# Patient Record
Sex: FEMALE | Race: OTHER
[De-identification: names, ages, dates, MRNs, and addresses within clinical notes are randomized per-mention and may not be internally consistent; named-entity substitution may affect disease eponyms.]

---

## 2017-02-19 NOTE — EMERGENCY ROOM REPORT
History of Present Illness


General


Chief Complaint:  Abdominal Pain


Source:  Patient





Present Illness


HPI


30 YO F with 2 days of urinary urge incontinence, dysuria, frequency. Denies 

abd pain, nausea/vomiting, diarrhea, fever/chills, flank pain, previous abd/

pelvic surgery.


Allergies:  


Coded Allergies:  


     No Known Allergies (Unverified , 2/19/17)





Patient History


Past Medical History:  none


Past Surgical History:  none


Pertinent Family History:  none


Social History:  Denies: alcohol use, drug use, smoking


Last Menstrual Period:  2/14/17


Pregnant Now:  No


Immunizations:  UTD


Reviewed Nursing Documentation:  PMH: Agreed, PSxH: Agreed





Nursing Documentation-PMH


Past Medical History:  No History, Except For


Hx Gastrointestinal Problems:  Yes - GERD


History Of Psychiatric Problem:  Yes - anxiety, depression





Review of Systems


All Other Systems:  negative except mentioned in HPI





Physical Exam





Vital Signs








  Date Time  Temp Pulse Resp B/P Pulse Ox O2 Delivery O2 Flow Rate FiO2


 


2/19/17 11:42 97.9 83 18 99/55 100 Room Air  








Sp02 EP Interpretation:  reviewed, normal


General Appearance:  normal inspection, well appearing, no apparent distress, 

alert


Head:  atraumatic


ENT:  normal ENT inspection, hearing grossly normal, normal voice


Neck:  normal inspection, full range of motion, supple, no bony tend


Respiratory:  normal inspection, lungs clear, normal breath sounds, no 

respiratory distress, no retraction, no wheezing


Cardiovascular #1:  regular rate, rhythm, no edema


Gastrointestinal:  normal inspection, normal bowel sounds, non tender, soft, no 

guarding, no hernia


Genitourinary:  no CVA tenderness


Musculoskeletal:  normal inspection, back normal, normal range of motion, Rich'

s Sign negative


Neurologic:  normal inspection, alert, oriented x3, responsive, CNs III-XII nml 

as tested, motor strength/tone normal, speech normal


Psychiatric:  normal inspection, judgement/insight normal, mood/affect normal


Skin:  normal inspection, normal color, no rash





Medical Decision Making


Diagnostic Impression:  


 Primary Impression:  


 Urinary tract infection


 Qualified Codes:  N30.01 - Acute cystitis with hematuria


ER Course


30 YO F with dysuria.  VSS.  Afebrile.  No systemic symptoms.  Low concern for 

pyelo.


UA grossly infected


Rx macrobid


DC home





Last Vital Signs








  Date Time  Temp Pulse Resp B/P Pulse Ox O2 Delivery O2 Flow Rate FiO2


 


2/19/17 12:29 97.9 83 18 99/55 100 Room Air  








Status:  improved


Disposition:  HOME, SELF-CARE


Condition:  Improved


Scripts


Nitrofurantoin Monohyd/M-Cryst* (MACROBID 100 MG*) 100 Mg Capsule


100 MG ORAL EVERY 12 HOURS for 7 Days, #14 CAP


   Prov: DIXIE GREENE M.D.         2/19/17


Patient Instructions:  Dysuria











DIXIE GREENE M.D. Feb 19, 2017 13:21

## 2019-10-24 ENCOUNTER — HOSPITAL ENCOUNTER (EMERGENCY)
Dept: HOSPITAL 72 - EMR | Age: 34
Discharge: HOME | End: 2019-10-24
Payer: MEDICAID

## 2019-10-24 VITALS — DIASTOLIC BLOOD PRESSURE: 70 MMHG | SYSTOLIC BLOOD PRESSURE: 118 MMHG

## 2019-10-24 VITALS — HEIGHT: 61 IN | WEIGHT: 138 LBS | BODY MASS INDEX: 26.06 KG/M2
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
## 2019-10-24 DIAGNOSIS — K52.9: Primary | ICD-10-CM

## 2019-10-24 DIAGNOSIS — R11.2: ICD-10-CM

## 2019-10-24 DIAGNOSIS — K21.9: ICD-10-CM

## 2019-10-24 DIAGNOSIS — F32.9: ICD-10-CM

## 2019-10-24 DIAGNOSIS — T50.905A: ICD-10-CM

## 2019-10-24 LAB
ALBUMIN SERPL-MCNC: 4.4 G/DL (ref 3.4–5)
ALBUMIN/GLOB SERPL: 1.2 {RATIO} (ref 1–2.7)
ALP SERPL-CCNC: 45 U/L (ref 46–116)
ALT SERPL-CCNC: 19 U/L (ref 12–78)
ANION GAP SERPL CALC-SCNC: 15 MMOL/L (ref 5–15)
APPEARANCE UR: CLEAR
APTT PPP: (no result) S
AST SERPL-CCNC: 18 U/L (ref 15–37)
BILIRUB SERPL-MCNC: 0.8 MG/DL (ref 0.2–1)
BUN SERPL-MCNC: 6 MG/DL (ref 7–18)
CALCIUM SERPL-MCNC: 9.5 MG/DL (ref 8.5–10.1)
CHLORIDE SERPL-SCNC: 103 MMOL/L (ref 98–107)
CO2 SERPL-SCNC: 24 MMOL/L (ref 21–32)
CREAT SERPL-MCNC: 0.5 MG/DL (ref 0.55–1.3)
GLOBULIN SER-MCNC: 3.8 G/DL
GLUCOSE UR STRIP-MCNC: NEGATIVE MG/DL
KETONES UR QL STRIP: (no result)
LEUKOCYTE ESTERASE UR QL STRIP: NEGATIVE
NITRITE UR QL STRIP: NEGATIVE
PH UR STRIP: 5 [PH] (ref 4.5–8)
POTASSIUM SERPL-SCNC: 3.9 MMOL/L (ref 3.5–5.1)
PROT UR QL STRIP: NEGATIVE
SODIUM SERPL-SCNC: 142 MMOL/L (ref 136–145)
SP GR UR STRIP: 1.01 (ref 1–1.03)
UROBILINOGEN UR-MCNC: NORMAL MG/DL (ref 0–1)

## 2019-10-24 PROCEDURE — 81025 URINE PREGNANCY TEST: CPT

## 2019-10-24 PROCEDURE — 84484 ASSAY OF TROPONIN QUANT: CPT

## 2019-10-24 PROCEDURE — 96361 HYDRATE IV INFUSION ADD-ON: CPT

## 2019-10-24 PROCEDURE — 96374 THER/PROPH/DIAG INJ IV PUSH: CPT

## 2019-10-24 PROCEDURE — 80053 COMPREHEN METABOLIC PANEL: CPT

## 2019-10-24 PROCEDURE — 81001 URINALYSIS AUTO W/SCOPE: CPT

## 2019-10-24 PROCEDURE — 99284 EMERGENCY DEPT VISIT MOD MDM: CPT

## 2019-10-24 PROCEDURE — 71045 X-RAY EXAM CHEST 1 VIEW: CPT

## 2019-10-24 PROCEDURE — 36415 COLL VENOUS BLD VENIPUNCTURE: CPT

## 2019-10-24 PROCEDURE — 93005 ELECTROCARDIOGRAM TRACING: CPT

## 2019-10-24 NOTE — DIAGNOSTIC IMAGING REPORT
Indication: Cough

 

Technique: One view of the chest

 

Comparison:

 

Findings: Lungs and pleural spaces are clear. Heart size is normal

 

Impression: No acute process

## 2019-10-24 NOTE — NUR
ED Nurse Note:

pt walked in with mother c/o n/v ermd eval done blood and urine sent ivf up 
infusing  medicated will monitor. imaging also done.

## 2019-10-24 NOTE — EMERGENCY ROOM REPORT
History of Present Illness


General


Chief Complaint:  Nausea, Vomiting, and Diarrhea


Source:  Patient, Medical Record





Present Illness


HPI


34-year-old female with history of depression with psychotic feature currently 

on Celexa, alprazolam, and Risperdal which were started 1 week ago here 

complaining of 4 days of nausea, vomiting, and multiple bouts of diarrhea 

without any blood.  Denies fever and chills, abdominal pain.  Denies urinary 

symptoms.  Reports her last menstrual period was a month ago and regular 

however does not know with her pregnant or not.  Has not taken medication for 

symptom relief.  Denies chest pain, shortness of breath, fever and chills, 

palpitation.  Denies suicidal and homicidal ideations.  Reports that she has 

been taking alprazolam for a long time however 1 week ago she was started on 

Celexa and Risperdal.  Patient called psychiatrist today and was told to go to 

the emergency room for nausea vomiting.  Patient appears in no distress and 

with stable vital signs.


Allergies:  


Coded Allergies:  


     No Known Allergies (Unverified , 2/19/17)





Patient History


Past Medical History:  see triage record


Past Surgical History:  unable to obtain


Pertinent Family History:  none


Last Menstrual Period:  09/14/19


Pregnant Now:  No


Immunizations:  UTD


Reviewed Nursing Documentation:  PMH: Agreed; PSxH: Agreed





Nursing Documentation-PMH


Past Medical History:  No History, Except For


Hx Gastrointestinal Problems:  Yes - GERD





Review of Systems


All Other Systems:  negative except mentioned in HPI





Physical Exam





Vital Signs








  Date Time  Temp Pulse Resp B/P (MAP) Pulse Ox O2 Delivery O2 Flow Rate FiO2


 


10/24/19 13:28 97.9 85 18 109/69 (82) 97 Room Air  








Sp02 EP Interpretation:  reviewed, normal


General Appearance:  no apparent distress, alert, GCS 15, non-toxic


Head:  normocephalic, atraumatic


Eyes:  bilateral eye normal inspection, bilateral eye PERRL


ENT:  hearing grossly normal, normal pharynx, no angioedema, normal voice


Neck:  full range of motion, supple, supple/symm/no masses


Respiratory:  chest non-tender, lungs clear, normal breath sounds, no rhonchi, 

no wheezing, speaking full sentences


Cardiovascular #1:  regular rate, rhythm, no edema, no JVD, no murmur, normal 

capillary refill


Cardiovascular #2:  2+ radial (R), 2+ radial (L)


Gastrointestinal:  normal bowel sounds, non tender, soft, no mass, no 

organomegaly, no peritonitis, non-distended, no guarding, no rebound


Genitourinary:  no CVA tenderness


Musculoskeletal:  back normal, gait/station normal, normal range of motion, non-

tender, no calf tenderness


Neurologic:  alert, oriented x3, responsive, motor strength/tone normal, 

sensory intact, speech normal


Psychiatric:  judgement/insight normal, memory normal, mood/affect normal, no 

suicidal/homicidal ideation


Skin:  no rash


Lymphatic:  no adenopathy





Medical Decision Making


PA Attestation


All diagnoses and treatment plans were reviewed and discussed with my 

supervising physician Dr. Chavez


Diagnostic Impression:  


 Primary Impression:  


 Drug-induced nausea and vomiting


 Additional Impression:  


 Acute gastroenteritis


ER Course


34-year-old female with history of depression with psychotic feature currently 

on Celexa, alprazolam, and Risperdal which were started 1 week ago here 

complaining of 4 days of nausea, vomiting, and multiple bouts of diarrhea 

without any blood.  Denies fever and chills, abdominal pain.  Denies urinary 

symptoms.  Reports her last menstrual period was a month ago and regular 

however does not know with her pregnant or not.  Has not taken medication for 

symptom relief.  Denies chest pain, shortness of breath, fever and chills, 

palpitation.  Denies suicidal and homicidal ideations.  Reports that she has 

been taking alprazolam for a long time however 1 week ago she was started on 

Celexa and Risperdal.  Patient called psychiatrist today and was told to go to 

the emergency room for nausea vomiting.  Patient appears in no distress and 

with stable vital signs.





Ddx considered but are not limited to: Appendicitis, complication of pregnancy, 

UTI, acute gastroenteritis, drug-induced nausea vomiting














Vital signs: are WNL, pt. is afebrile








 H&PE are most consistent with: Drug-induced nausea vomiting, acute 

gastroenteritis














ORDERS: CBC, CMP, tox screen, urine pregnancy test, EKG, chest x-ray, Benadryl








ED INTERVENTIONS: Zofran, NS bolus, Pepcid




















DISCHARGE: At this time pt. is stable for d/c to home. Will provide printed 

patient care instructions, and any necessary prescriptions. Care plan and 

follow up instructions have been discussed with the patient prior to discharge.

  No imaging is needed as patient does not elicit any abdominal pain.  Advised 

patient to follow-up with a psychiatrist in regards to the dosing of Risperdal 

as her symptoms may be secondary to start of any medication.  Patient denies 

any suicidal homicidal ideation at time of discharge.  Patient makes an 

appointment with psychiatrist today.  Cannot give any Zofran with the patient 

taking Celexa.  Advised her to return to the emergency room for worsening 

symptoms.


EKG Diagnostic Results


Rate:  normal


Rhythm:  NSR


ST Segments:  no acute changes


Other Impression


no acute ST changes





Chest X-Ray Diagnostic Results


Chest X-Ray Diagnostic Results :  


   Chest X-Ray Ordered:  Yes


   # of Views/Limited/Complete:  1 View


   Indication:  Other


   EP Interpretation:  Yes


   PA Xray:  Interpretation reviewed, and agrees with findings.


   Interpretation:  no consolidation, no effusion, no pneumothorax, no acute 

cardiopulmonary disease


   Electronically Signed by:  Erna Couch PA-C





Last Vital Signs








  Date Time  Temp Pulse Resp B/P (MAP) Pulse Ox O2 Delivery O2 Flow Rate FiO2


 


10/24/19 13:28 97.9 85 18 109/69 (82) 97 Room Air  








Disposition:  HOME, SELF-CARE


Condition:  Stable


Scripts


Diphenhydramine HCl (Benadryl) 25 Mg Capsule


25 MG PO TID, #15 CAP


   Prov: Erna Prince         10/24/19


Referrals:  


[facility],REFERRING (PCP)


Patient Instructions:  Nausea and Vomiting, Adult, Easy-to-Read, Viral 

Gastroenteritis, Adult, Easy-to-Read





Additional Instructions:  


Take medication as directed, keep an BRAT diet, increase oral hydration and 

follow-up with a primary care provider or psychiatrist regarding your 

psychiatric medication possible side effects being nausea and vomiting.











Erna Prince Oct 24, 2019 16:18